# Patient Record
Sex: MALE | Race: WHITE | Employment: FULL TIME | URBAN - METROPOLITAN AREA
[De-identification: names, ages, dates, MRNs, and addresses within clinical notes are randomized per-mention and may not be internally consistent; named-entity substitution may affect disease eponyms.]

---

## 2021-03-03 ENCOUNTER — HOSPITAL ENCOUNTER (EMERGENCY)
Facility: CLINIC | Age: 41
Discharge: HOME | End: 2021-03-03
Attending: EMERGENCY MEDICINE | Admitting: EMERGENCY MEDICINE
Payer: COMMERCIAL

## 2021-03-03 ENCOUNTER — APPOINTMENT (OUTPATIENT)
Dept: CT IMAGING | Facility: CLINIC | Age: 41
End: 2021-03-03
Attending: EMERGENCY MEDICINE
Payer: COMMERCIAL

## 2021-03-03 VITALS
TEMPERATURE: 97.9 F | RESPIRATION RATE: 18 BRPM | HEART RATE: 82 BPM | SYSTOLIC BLOOD PRESSURE: 138 MMHG | DIASTOLIC BLOOD PRESSURE: 93 MMHG | OXYGEN SATURATION: 99 %

## 2021-03-03 DIAGNOSIS — N20.0 KIDNEY STONE: ICD-10-CM

## 2021-03-03 DIAGNOSIS — R91.1 PULMONARY NODULE: ICD-10-CM

## 2021-03-03 LAB
ALBUMIN SERPL-MCNC: 4.2 G/DL (ref 3.4–5)
ALP SERPL-CCNC: 76 U/L (ref 40–150)
ALT SERPL W P-5'-P-CCNC: 114 U/L (ref 0–70)
ANION GAP SERPL CALCULATED.3IONS-SCNC: 4 MMOL/L (ref 3–14)
AST SERPL W P-5'-P-CCNC: 62 U/L (ref 0–45)
BILIRUB SERPL-MCNC: 0.4 MG/DL (ref 0.2–1.3)
BUN SERPL-MCNC: 23 MG/DL (ref 7–30)
CALCIUM SERPL-MCNC: 9 MG/DL (ref 8.5–10.1)
CHLORIDE SERPL-SCNC: 108 MMOL/L (ref 94–109)
CO2 SERPL-SCNC: 28 MMOL/L (ref 20–32)
CREAT SERPL-MCNC: 0.95 MG/DL (ref 0.66–1.25)
ERYTHROCYTE [DISTWIDTH] IN BLOOD BY AUTOMATED COUNT: 12.2 % (ref 10–15)
GFR SERPL CREATININE-BSD FRML MDRD: >90 ML/MIN/{1.73_M2}
GLUCOSE SERPL-MCNC: 103 MG/DL (ref 70–99)
HCT VFR BLD AUTO: 47 % (ref 40–53)
HGB BLD-MCNC: 16 G/DL (ref 13.3–17.7)
MCH RBC QN AUTO: 30.4 PG (ref 26.5–33)
MCHC RBC AUTO-ENTMCNC: 34 G/DL (ref 31.5–36.5)
MCV RBC AUTO: 89 FL (ref 78–100)
PLATELET # BLD AUTO: 219 10E9/L (ref 150–450)
POTASSIUM SERPL-SCNC: 3.4 MMOL/L (ref 3.4–5.3)
PROT SERPL-MCNC: 7.7 G/DL (ref 6.8–8.8)
RBC # BLD AUTO: 5.27 10E12/L (ref 4.4–5.9)
SODIUM SERPL-SCNC: 140 MMOL/L (ref 133–144)
WBC # BLD AUTO: 7.5 10E9/L (ref 4–11)

## 2021-03-03 PROCEDURE — 85027 COMPLETE CBC AUTOMATED: CPT | Performed by: EMERGENCY MEDICINE

## 2021-03-03 PROCEDURE — 74176 CT ABD & PELVIS W/O CONTRAST: CPT

## 2021-03-03 PROCEDURE — 80053 COMPREHEN METABOLIC PANEL: CPT | Performed by: EMERGENCY MEDICINE

## 2021-03-03 PROCEDURE — 99284 EMERGENCY DEPT VISIT MOD MDM: CPT | Mod: 25

## 2021-03-03 RX ORDER — OXYCODONE HYDROCHLORIDE 5 MG/1
5-10 TABLET ORAL EVERY 6 HOURS PRN
Qty: 12 TABLET | Refills: 0 | Status: SHIPPED | OUTPATIENT
Start: 2021-03-03

## 2021-03-03 RX ORDER — SODIUM CHLORIDE 9 MG/ML
INJECTION, SOLUTION INTRAVENOUS CONTINUOUS
Status: DISCONTINUED | OUTPATIENT
Start: 2021-03-03 | End: 2021-03-03 | Stop reason: HOSPADM

## 2021-03-03 RX ORDER — TAMSULOSIN HYDROCHLORIDE 0.4 MG/1
0.4 CAPSULE ORAL DAILY
Qty: 7 CAPSULE | Refills: 0 | Status: SHIPPED | OUTPATIENT
Start: 2021-03-03 | End: 2021-03-10

## 2021-03-03 ASSESSMENT — ENCOUNTER SYMPTOMS
HEMATURIA: 0
ABDOMINAL PAIN: 1
DIARRHEA: 0
FREQUENCY: 1
DYSURIA: 0
VOMITING: 0
BACK PAIN: 1
NAUSEA: 0
FLANK PAIN: 1
FEVER: 0
CHILLS: 0

## 2021-03-03 NOTE — DISCHARGE INSTRUCTIONS
Strain your urine with the strainer (we provide to you) until the stone appears in the strainer.  Use the combination of tylenol, ibuprofen, and benadryl every six hours for the first 2 days.  Use oxycodone as needed for increased pain.  Increase your fluid intake.

## 2021-03-03 NOTE — ED PROVIDER NOTES
History   Chief Complaint:  Flank Pain    HPI   Foreign Mann is a 40 year old male who presents for evaluation of flank pain and increase urinary frequency. The patient was seen in urgent care 03/01/21 after 3 days of increased urinary frequency. UA/UC was obtained that the patient states showed possible infection, though these records are inaccessible. He was started on Macrobid. He returns because his symptoms have not improved. He also reports waking up this morning at 3AM with right flank pain that radiates into his abdomen and up his back. He describes feeling that he was going to have diarrhea but did not. He denies fever, chills, dysuria, hematuria, nausea or vomiting. He denies personal or family history of kidney stones. He denies medical issues and is not on regular medications, though he states he does not go to the doctor routinely. He endorses daily alcohol use.    Review of Systems   Constitutional: Negative for chills and fever.   Gastrointestinal: Positive for abdominal pain. Negative for diarrhea, nausea and vomiting.   Genitourinary: Positive for flank pain and frequency. Negative for dysuria and hematuria.   Musculoskeletal: Positive for back pain.   All other systems reviewed and are negative.    Allergies:  The patient has no known allergies.     Medications:  Pyridium   Macrobid    Past Medical History:    Alcohol use    Family History:    No family history of kidney stones    Social History:  The patient arrived to the ED Alone via private car.  Occupation: Installs TV/car access/cameras  Tobacco Use: Current Every Day Smoker   Alcohol Use: Yes  Drug Use: Occasional marijuana use    Physical Exam     Patient Vitals for the past 24 hrs:   BP Temp Temp src Pulse Resp SpO2   03/03/21 0705 (!) 138/93 -- -- -- -- --   03/03/21 0653 (!) 170/102 97.9  F (36.6  C) Oral 82 18 99 %       Physical Exam   Nursing note and vitals reviewed.  General: Oriented to person, place, and time. Appears  well-developed and well-nourished.   Head: No signs of trauma.   Mouth/Throat: Oropharynx is clear and moist.   Eyes: Conjunctivae are normal. Pupils are equal, round, and reactive to light.   Neck: Normal range of motion. No nuchal rigidity.   Cardiovascular: Normal rate and regular rhythm.    Respiratory: Effort normal and breath sounds normal. No respiratory distress.   Abdominal: Soft. There is no tenderness. There is no guarding.   Musculoskeletal: Normal range of motion. no edema.   Neurological: The patient is alert and oriented to person, place, and time.  PERRLA, EOMI, visual fields intact, strength in upper/lower extremities normal and symmetrical.   Sensation normal. Speech normal  GCS eye subscore is 4. GCS verbal subscore is 5. GCS motor subscore is 6.   Skin: Skin is warm and dry. No rash noted.   Psychiatric: normal mood and affect. behavior is normal.     Emergency Department Course     Imaging:  CT Abdomen Pelvis without Contrast  1.  Obstructing 0.2 cm stone in the distal right ureter causes mild   right hydronephrosis.   2.  Diffuse fatty infiltration of the liver.   3.  Indeterminate 0.5 cm right middle lobe pulmonary nodule. Please   refer to pulmonary nodule follow-up guidelines below.     Reading per radiology.    Laboratory:  CBC: WBC: 7.5, HGB: 16.0, PLT: 219    CMP: Glucose 103 (H), ALT: 114 (H), AST: 62 (H), o/w WNL (Creatinine: 0.95)    Emergency Department Course:    Reviewed:  I reviewed the patient's nursing notes, vitals and past medical history.     Assessments:  0700 I performed an exam of the patient in room ED20 as documented above.  0808 I updated the patient on results and discussed plan of care. We discussed that due to the patient's smoking history, he should follow up on pulmonary nodule shown on exam.    Disposition:  The patient was discharged to home.     Impression & Plan     Medical Decision Making:  Foreign Mann is a 40 year old male presented to the Emergency  Department with a complaint of right flank pain and urinary frequency. The workup in the ED did demonstrate a 2 mm renal stone on the right side. UA was not obtained as the patient was seen in urgent care and previously started on Macrobid which he should continue taking. At this time, the patient's pain is been controlled. I believe he can be discharged and can follow up in the outpatient setting. We discussed incidental pulmonary nodule finding which he should promptly follow up on. I have encouraged close Primary Care Physician follow up. If symptoms persist, Urology evaluation will be warranted. I have encouraged the patient to return to the ED for symptoms such as intractable pain, fever, and persistent vomiting. Full anticipatory guidance given prior to discharge.    Diagnosis:    ICD-10-CM    1. Kidney stone  N20.0    2. Pulmonary nodule  R91.1      Discharge Medications:   New Prescriptions    OXYCODONE (ROXICODONE) 5 MG TABLET    Take 1-2 tablets (5-10 mg) by mouth every 6 hours as needed for pain    TAMSULOSIN (FLOMAX) 0.4 MG CAPSULE    Take 1 capsule (0.4 mg) by mouth daily for 7 days       Scribe Disclosure:  I, Suzette James, am serving as a scribe at 7:04 AM on 3/3/2021 to document services personally performed by Salas Johns MD based on my observations and the provider's statements to me.     This note was completed in part using Dragon voice recognition software. Although reviewed after completion, some word and grammatical errors may occur.      Salas Johns MD  03/03/21 4460

## 2021-03-20 ENCOUNTER — HEALTH MAINTENANCE LETTER (OUTPATIENT)
Age: 41
End: 2021-03-20

## 2021-09-04 ENCOUNTER — HEALTH MAINTENANCE LETTER (OUTPATIENT)
Age: 41
End: 2021-09-04

## 2022-04-16 ENCOUNTER — HEALTH MAINTENANCE LETTER (OUTPATIENT)
Age: 42
End: 2022-04-16

## 2022-10-16 ENCOUNTER — HEALTH MAINTENANCE LETTER (OUTPATIENT)
Age: 42
End: 2022-10-16

## 2023-06-01 ENCOUNTER — HEALTH MAINTENANCE LETTER (OUTPATIENT)
Age: 43
End: 2023-06-01

## 2025-02-21 ENCOUNTER — HOSPITAL ENCOUNTER (EMERGENCY)
Facility: CLINIC | Age: 45
Discharge: HOME | End: 2025-02-21
Attending: EMERGENCY MEDICINE | Admitting: EMERGENCY MEDICINE
Payer: COMMERCIAL

## 2025-02-21 VITALS
HEART RATE: 91 BPM | TEMPERATURE: 97.8 F | SYSTOLIC BLOOD PRESSURE: 181 MMHG | DIASTOLIC BLOOD PRESSURE: 91 MMHG | OXYGEN SATURATION: 99 % | RESPIRATION RATE: 14 BRPM

## 2025-02-21 DIAGNOSIS — T88.7XXA MEDICATION SIDE EFFECTS: ICD-10-CM

## 2025-02-21 DIAGNOSIS — I45.10 RIGHT BUNDLE BRANCH BLOCK: ICD-10-CM

## 2025-02-21 LAB
ALBUMIN SERPL BCG-MCNC: 4.4 G/DL (ref 3.5–5.2)
ALP SERPL-CCNC: 69 U/L (ref 40–150)
ALT SERPL W P-5'-P-CCNC: 20 U/L (ref 0–70)
ANION GAP SERPL CALCULATED.3IONS-SCNC: 11 MMOL/L (ref 7–15)
AST SERPL W P-5'-P-CCNC: 18 U/L (ref 0–45)
BASOPHILS # BLD AUTO: 0.1 10E3/UL (ref 0–0.2)
BASOPHILS NFR BLD AUTO: 1 %
BILIRUB SERPL-MCNC: 0.4 MG/DL
BUN SERPL-MCNC: 9.4 MG/DL (ref 6–20)
CALCIUM SERPL-MCNC: 9.5 MG/DL (ref 8.8–10.4)
CHLORIDE SERPL-SCNC: 101 MMOL/L (ref 98–107)
CREAT SERPL-MCNC: 0.92 MG/DL (ref 0.67–1.17)
EGFRCR SERPLBLD CKD-EPI 2021: >90 ML/MIN/1.73M2
EOSINOPHIL # BLD AUTO: 0.2 10E3/UL (ref 0–0.7)
EOSINOPHIL NFR BLD AUTO: 1 %
ERYTHROCYTE [DISTWIDTH] IN BLOOD BY AUTOMATED COUNT: 12.8 % (ref 10–15)
GLUCOSE SERPL-MCNC: 105 MG/DL (ref 70–99)
HCO3 SERPL-SCNC: 26 MMOL/L (ref 22–29)
HCT VFR BLD AUTO: 43.2 % (ref 40–53)
HGB BLD-MCNC: 14.8 G/DL (ref 13.3–17.7)
HOLD SPECIMEN: NORMAL
IMM GRANULOCYTES # BLD: 0 10E3/UL
IMM GRANULOCYTES NFR BLD: 0 %
LYMPHOCYTES # BLD AUTO: 3.1 10E3/UL (ref 0.8–5.3)
LYMPHOCYTES NFR BLD AUTO: 27 %
MCH RBC QN AUTO: 28.1 PG (ref 26.5–33)
MCHC RBC AUTO-ENTMCNC: 34.3 G/DL (ref 31.5–36.5)
MCV RBC AUTO: 82 FL (ref 78–100)
MONOCYTES # BLD AUTO: 0.8 10E3/UL (ref 0–1.3)
MONOCYTES NFR BLD AUTO: 7 %
NEUTROPHILS # BLD AUTO: 7.6 10E3/UL (ref 1.6–8.3)
NEUTROPHILS NFR BLD AUTO: 65 %
NRBC # BLD AUTO: 0 10E3/UL
NRBC BLD AUTO-RTO: 0 /100
PLATELET # BLD AUTO: 265 10E3/UL (ref 150–450)
POTASSIUM SERPL-SCNC: 3.7 MMOL/L (ref 3.4–5.3)
PROT SERPL-MCNC: 7.4 G/DL (ref 6.4–8.3)
RBC # BLD AUTO: 5.27 10E6/UL (ref 4.4–5.9)
SODIUM SERPL-SCNC: 138 MMOL/L (ref 135–145)
TROPONIN T SERPL HS-MCNC: <6 NG/L
WBC # BLD AUTO: 11.7 10E3/UL (ref 4–11)

## 2025-02-21 PROCEDURE — 84484 ASSAY OF TROPONIN QUANT: CPT | Performed by: EMERGENCY MEDICINE

## 2025-02-21 PROCEDURE — 93005 ELECTROCARDIOGRAM TRACING: CPT

## 2025-02-21 PROCEDURE — 85018 HEMOGLOBIN: CPT | Performed by: EMERGENCY MEDICINE

## 2025-02-21 PROCEDURE — 36415 COLL VENOUS BLD VENIPUNCTURE: CPT | Performed by: EMERGENCY MEDICINE

## 2025-02-21 PROCEDURE — 85004 AUTOMATED DIFF WBC COUNT: CPT | Performed by: EMERGENCY MEDICINE

## 2025-02-21 PROCEDURE — 80053 COMPREHEN METABOLIC PANEL: CPT | Performed by: EMERGENCY MEDICINE

## 2025-02-21 PROCEDURE — 85025 COMPLETE CBC W/AUTO DIFF WBC: CPT | Performed by: EMERGENCY MEDICINE

## 2025-02-21 PROCEDURE — 82435 ASSAY OF BLOOD CHLORIDE: CPT | Performed by: EMERGENCY MEDICINE

## 2025-02-21 PROCEDURE — 82247 BILIRUBIN TOTAL: CPT | Performed by: EMERGENCY MEDICINE

## 2025-02-21 PROCEDURE — 82040 ASSAY OF SERUM ALBUMIN: CPT | Performed by: EMERGENCY MEDICINE

## 2025-02-21 PROCEDURE — 99284 EMERGENCY DEPT VISIT MOD MDM: CPT

## 2025-02-21 PROCEDURE — 85041 AUTOMATED RBC COUNT: CPT | Performed by: EMERGENCY MEDICINE

## 2025-02-21 ASSESSMENT — COLUMBIA-SUICIDE SEVERITY RATING SCALE - C-SSRS
1. IN THE PAST MONTH, HAVE YOU WISHED YOU WERE DEAD OR WISHED YOU COULD GO TO SLEEP AND NOT WAKE UP?: NO
2. HAVE YOU ACTUALLY HAD ANY THOUGHTS OF KILLING YOURSELF IN THE PAST MONTH?: NO
6. HAVE YOU EVER DONE ANYTHING, STARTED TO DO ANYTHING, OR PREPARED TO DO ANYTHING TO END YOUR LIFE?: NO

## 2025-02-21 ASSESSMENT — ACTIVITIES OF DAILY LIVING (ADL)
ADLS_ACUITY_SCORE: 41

## 2025-02-22 NOTE — ED TRIAGE NOTES
"Reports feeling very anxious this morning and took a sertraline at 1300 for the fist time, took a nap and when he woke up at 1800 had a \"flutter sensation on his heart\"     Triage Assessment (Adult)       Row Name 02/21/25 2002          Triage Assessment    Airway WDL WDL        Respiratory WDL    Respiratory WDL WDL        Skin Circulation/Temperature WDL    Skin Circulation/Temperature WDL WDL        Cardiac WDL    Cardiac WDL X  hypertensive        Peripheral/Neurovascular WDL    Peripheral Neurovascular WDL WDL        Cognitive/Neuro/Behavioral WDL    Cognitive/Neuro/Behavioral WDL X;mood/behavior     Mood/Behavior anxious                     "

## 2025-02-22 NOTE — ED PROVIDER NOTES
"  Emergency Department Note      History of Present Illness     Chief Complaint   Palpitations      HPI   Foreign Mann is a 44 year old male with a history of tobacco use disorder who presents to the ED for evaluation of palpitations. The patient reports that he recently terminated use of Topamax, Wellbutrin, and metformin on 2/11/25 due to anxiety via a telehealth visit, with additional termination of magnesium and ashwagandha supplements. He states that on 2/19/25 he was prescribed Zoloft and took his first dose of 0.5 tablet today around 1500 followed by a nap, of which he woke up from and felt \"fluttery\" with brain fog, prompting today's visit. He adds that he also took hydroxyzine to help with sleep the last two nights, but notes an onset of nocturnal diaphoresis last night with multiple episodes of arousal from sleep. He denies any loss of consciousness, shortness of breath, chest pain, or palpitations. Patient is notably concerned about medication interactions and possible reactions.     Independent Historian   None    Review of External Notes   None    Past Medical History     Medical History and Problem List   Tobacco use disorder    Medications   Zoloft    Physical Exam     Patient Vitals for the past 24 hrs:   BP Temp Temp src Pulse Resp SpO2   02/21/25 2317 (!) 181/91 -- -- 91 -- 99 %   02/21/25 2002 (!) 195/104 97.8  F (36.6  C) Temporal 93 14 97 %     Physical Exam  General: Well appearing, nontoxic.  Resting comfortably  Head:  Scalp, face, and head appear normal  Eyes:  Pupils are equal, round    Conjunctivae non-injected and sclerae white  ENT:    The external nose is normal    Pinnae are normal  Neck:  Normal range of motion    There is no rigidity noted    Trachea is in the midline  CV:  Regular rate and rhythm     Normal S1/S2, no S3/S4    No murmur or rub. Radial pulses 2+ bilaterally.  Resp:  Lungs are clear and equal bilaterally  There is no tachypnea    No increased work of " breathing    No rales, wheezing, or rhonchi  GI:  Abdomen is soft, no rigidity or guarding    No distension, or mass    No tenderness or rebound tenderness   MS:  Normal muscular tone    Symmetric motor strength    No lower extremity edema  Skin:  No rash or acute skin lesions noted  Neuro:  Awake and alert  Speech is normal and fluent  Moves all extremities spontaneously  Psych: Anxious affect. Appropriate interactions.      Diagnostics     Lab Results   Labs Ordered and Resulted from Time of ED Arrival to Time of ED Departure   COMPREHENSIVE METABOLIC PANEL - Abnormal       Result Value    Sodium 138      Potassium 3.7      Carbon Dioxide (CO2) 26      Anion Gap 11      Urea Nitrogen 9.4      Creatinine 0.92      GFR Estimate >90      Calcium 9.5      Chloride 101      Glucose 105 (*)     Alkaline Phosphatase 69      AST 18      ALT 20      Protein Total 7.4      Albumin 4.4      Bilirubin Total 0.4     CBC WITH PLATELETS AND DIFFERENTIAL - Abnormal    WBC Count 11.7 (*)     RBC Count 5.27      Hemoglobin 14.8      Hematocrit 43.2      MCV 82      MCH 28.1      MCHC 34.3      RDW 12.8      Platelet Count 265      % Neutrophils 65      % Lymphocytes 27      % Monocytes 7      % Eosinophils 1      % Basophils 1      % Immature Granulocytes 0      NRBCs per 100 WBC 0      Absolute Neutrophils 7.6      Absolute Lymphocytes 3.1      Absolute Monocytes 0.8      Absolute Eosinophils 0.2      Absolute Basophils 0.1      Absolute Immature Granulocytes 0.0      Absolute NRBCs 0.0     TROPONIN T, HIGH SENSITIVITY - Normal    Troponin T, High Sensitivity <6         Imaging   No orders to display       EKG   ECG taken at 2004, ECG read at 2314  Normal sinus rhythm  Right bundle branch block  Abnormal ECG   Rate 89 bpm. ND interval 154 ms. QRS duration 126 ms. QT/QTc 400/486 ms. P-R-T axes 22 6 3.    Independent Interpretation   None    ED Course      Medications Administered   Medications - No data to display    Procedures    Procedures     Discussion of Management   None    ED Course   ED Course as of 02/22/25 1530   Fri Feb 21, 2025 2300 I obtained history and examined the patient as noted above     2314 We discussed plans for discharge and the patient is agreeable with this plan.         Additional Documentation  None    Medical Decision Making / Diagnosis     CMS Diagnoses: None    MIPS       None    MDM   Foreign Mann is a 44 year old male who presents with the above symptoms and is concern for possible medication adverse reaction after starting a low-dose of Zoloft today.  On my evaluation he is well-appearing, hemodynamically stable and afebrile.  Zoloft was started for treatment of his anxiety.  He seems to have significant somatic symptoms associated with this.  ED evaluation is reassuring.  EKG shows sinus rhythm with no evidence of acute ischemia or dysrhythmia.  He does have a right bundle branch block.  There is no EKG available for comparison but this is likely incidental.  No other concerning morphology.  Laboratory studies are unremarkable with no metabolic disturbance.  No significant leukocytosis or anemia.  I discussed with the patient that his symptoms could be due to the anxiety of starting a new medication versus a medication side effect.  I encouraged him to continue this medication and discussed with him that it can take several days for the body to adjust to a new medication.  At this time there is no evidence for any life-threatening emergent or serious underlying pathology.  I recommended supportive care at home with close outpatient follow-up with primary care and psychiatry.  Patient is agreeable to plan of care and he was discharged in stable condition.    Disposition   The patient was discharged.     Diagnosis     ICD-10-CM    1. Medication side effects  T88.7XXA       2. Right bundle branch block  I45.10     Follow up with primary care physician           Discharge Medications   Discharge  Medication List as of 2/21/2025 11:15 PM            Scribe Disclosure:  I, Silvestre Murray, am serving as a scribe at 10:36 PM on 2/21/2025 to document services personally performed by Edgardo Garcia MD based on my observations and the provider's statements to me.        Edgardo Garcia MD  02/22/25 1536

## 2025-02-23 ENCOUNTER — HEALTH MAINTENANCE LETTER (OUTPATIENT)
Age: 45
End: 2025-02-23

## 2025-02-24 LAB
ATRIAL RATE - MUSE: 89 BPM
DIASTOLIC BLOOD PRESSURE - MUSE: NORMAL MMHG
INTERPRETATION ECG - MUSE: NORMAL
P AXIS - MUSE: 22 DEGREES
PR INTERVAL - MUSE: 154 MS
QRS DURATION - MUSE: 126 MS
QT - MUSE: 400 MS
QTC - MUSE: 486 MS
R AXIS - MUSE: 6 DEGREES
SYSTOLIC BLOOD PRESSURE - MUSE: NORMAL MMHG
T AXIS - MUSE: 3 DEGREES
VENTRICULAR RATE- MUSE: 89 BPM

## 2025-05-28 ENCOUNTER — OFFICE VISIT (OUTPATIENT)
Dept: URGENT CARE | Facility: URGENT CARE | Age: 45
End: 2025-05-28
Payer: COMMERCIAL

## 2025-05-28 VITALS
TEMPERATURE: 98 F | RESPIRATION RATE: 20 BRPM | HEIGHT: 73 IN | SYSTOLIC BLOOD PRESSURE: 136 MMHG | WEIGHT: 315 LBS | DIASTOLIC BLOOD PRESSURE: 76 MMHG | BODY MASS INDEX: 41.75 KG/M2 | HEART RATE: 72 BPM | OXYGEN SATURATION: 98 %

## 2025-05-28 DIAGNOSIS — H60.502 ACUTE OTITIS EXTERNA OF LEFT EAR, UNSPECIFIED TYPE: Primary | ICD-10-CM

## 2025-05-28 DIAGNOSIS — H61.21 IMPACTED CERUMEN OF RIGHT EAR: ICD-10-CM

## 2025-05-28 PROCEDURE — 99204 OFFICE O/P NEW MOD 45 MIN: CPT | Performed by: PHYSICIAN ASSISTANT

## 2025-05-28 RX ORDER — SACCHAROMYCES BOULARDII 250 MG
250 CAPSULE ORAL 2 TIMES DAILY
Qty: 28 CAPSULE | Refills: 0 | Status: SHIPPED | OUTPATIENT
Start: 2025-05-28 | End: 2025-06-11

## 2025-05-28 RX ORDER — NEOMYCIN SULFATE, POLYMYXIN B SULFATE AND HYDROCORTISONE 3.5; 10000; 1 MG/ML; [IU]/ML; MG/ML
3 SOLUTION AURICULAR (OTIC) 4 TIMES DAILY
Qty: 10 ML | Refills: 0 | Status: SHIPPED | OUTPATIENT
Start: 2025-05-28 | End: 2025-05-28

## 2025-05-28 RX ORDER — SACCHAROMYCES BOULARDII 250 MG
250 CAPSULE ORAL 2 TIMES DAILY
Qty: 28 CAPSULE | Refills: 0 | Status: SHIPPED | OUTPATIENT
Start: 2025-05-28 | End: 2025-05-28

## 2025-05-28 RX ORDER — NEOMYCIN SULFATE, POLYMYXIN B SULFATE AND HYDROCORTISONE 3.5; 10000; 1 MG/ML; [IU]/ML; MG/ML
3 SOLUTION AURICULAR (OTIC) 4 TIMES DAILY
Qty: 10 ML | Refills: 0 | Status: SHIPPED | OUTPATIENT
Start: 2025-05-28

## 2025-05-28 NOTE — PROGRESS NOTES
Urgent Care Clinic Visit    Chief Complaint   Patient presents with    Otalgia     Left ear pain, plugged               5/28/2025     4:27 PM   Additional Questions   Roomed by hillary ziegler   Accompanied by self

## 2025-05-29 NOTE — PROGRESS NOTES
"SUBJECTIVE:  Foreign Mann is a 44 year old male who presents with left ear pain for 1 day(s).   Severity: moderate   Timing:gradual onset  Additional symptoms include right ear fullness.      History of recurrent otitis: no    No past medical history on file.  Current Outpatient Medications   Medication Sig Dispense Refill    amoxicillin-clavulanate (AUGMENTIN) 875-125 MG tablet Take 1 tablet by mouth 2 times daily for 7 days. 14 tablet 0    neomycin-polymyxin-hydrocortisone (CORTISPORIN) 3.5-50194-2 otic solution Place 3 drops in ear(s) 4 times daily. 10 mL 0    saccharomyces boulardii (FLORASTOR) 250 MG capsule Take 1 capsule (250 mg) by mouth 2 times daily for 14 days. 28 capsule 0    oxyCODONE (ROXICODONE) 5 MG tablet Take 1-2 tablets (5-10 mg) by mouth every 6 hours as needed for pain 12 tablet 0     Social History     Tobacco Use    Smoking status: Every Day     Current packs/day: 1.00     Types: Cigarettes    Smokeless tobacco: Never   Substance Use Topics    Alcohol use: Yes     Comment: daily       ROS:   Review of systems negative except as stated above.    OBJECTIVE:  /76   Pulse 72   Temp 98  F (36.7  C) (Oral)   Resp 20   Ht 1.854 m (6' 1\")   Wt (!) 145.2 kg (320 lb)   SpO2 98%   BMI 42.22 kg/m     EXAM following lavage by MA:  The right TM is normal: no effusions, no erythema, and normal landmarks     The right auditory canal is normal and without drainage, edema or erythema  The left TM is normal: no effusions, no erythema, and normal landmarks  The left auditory canal is erythematous, swollen, and tender  Oropharynx exam is normal: no lesions, erythema, adenopathy or exudate.  GENERAL: no acute distress  RESP: lungs clear to auscultation - no rales, rhonchi or wheezes  CV: regular rates and rhythm, normal S1 S2, no murmur noted  SKIN: no suspicious lesions or rashes     ASSESSMENT:  (H60.502) Acute otitis externa of left ear, unspecified type  (primary encounter diagnosis)  Plan: " amoxicillin-clavulanate (AUGMENTIN) 875-125 MG         tablet, neomycin-polymyxin-hydrocortisone         (CORTISPORIN) 3.5-57847-5 otic solution,         saccharomyces boulardii (FLORASTOR) 250 MG         capsule, DISCONTINUED: amoxicillin-clavulanate         (AUGMENTIN) 875-125 MG tablet, DISCONTINUED:         saccharomyces boulardii (FLORASTOR) 250 MG         capsule, DISCONTINUED:         neomycin-polymyxin-hydrocortisone (CORTISPORIN)        3.5-50038-8 otic solution      (H61.21) Impacted cerumen of right ear  Comment: cleared by MA  Plan: follow up prn    Red flags and emergent follow up discussed, and understood by patient  Follow up with PCP if symptoms worsen or fail to improve